# Patient Record
Sex: FEMALE | Race: OTHER | HISPANIC OR LATINO | Employment: FULL TIME | URBAN - METROPOLITAN AREA
[De-identification: names, ages, dates, MRNs, and addresses within clinical notes are randomized per-mention and may not be internally consistent; named-entity substitution may affect disease eponyms.]

---

## 2018-04-24 ENCOUNTER — APPOINTMENT (OUTPATIENT)
Dept: RADIOLOGY | Age: 23
End: 2018-04-24
Attending: FAMILY MEDICINE
Payer: COMMERCIAL

## 2018-04-24 ENCOUNTER — OFFICE VISIT (OUTPATIENT)
Dept: URGENT CARE | Age: 23
End: 2018-04-24
Payer: COMMERCIAL

## 2018-04-24 VITALS
HEIGHT: 61 IN | BODY MASS INDEX: 43.43 KG/M2 | DIASTOLIC BLOOD PRESSURE: 60 MMHG | TEMPERATURE: 98.4 F | SYSTOLIC BLOOD PRESSURE: 110 MMHG | WEIGHT: 230 LBS | HEART RATE: 97 BPM | RESPIRATION RATE: 18 BRPM | OXYGEN SATURATION: 97 %

## 2018-04-24 DIAGNOSIS — S99.911A ANKLE INJURY, RIGHT, INITIAL ENCOUNTER: Primary | ICD-10-CM

## 2018-04-24 DIAGNOSIS — S99.911A ANKLE INJURY, RIGHT, INITIAL ENCOUNTER: ICD-10-CM

## 2018-04-24 DIAGNOSIS — S80.212A ABRASION, LEFT KNEE, INITIAL ENCOUNTER: ICD-10-CM

## 2018-04-24 PROCEDURE — 73630 X-RAY EXAM OF FOOT: CPT

## 2018-04-24 PROCEDURE — 90715 TDAP VACCINE 7 YRS/> IM: CPT

## 2018-04-24 PROCEDURE — 73610 X-RAY EXAM OF ANKLE: CPT

## 2018-04-24 PROCEDURE — G0382 LEV 3 HOSP TYPE B ED VISIT: HCPCS | Performed by: FAMILY MEDICINE

## 2018-04-24 PROCEDURE — 90471 IMMUNIZATION ADMIN: CPT | Performed by: FAMILY MEDICINE

## 2018-04-24 PROCEDURE — 99283 EMERGENCY DEPT VISIT LOW MDM: CPT | Performed by: FAMILY MEDICINE

## 2018-04-24 RX ORDER — ALBUTEROL SULFATE 90 UG/1
2 AEROSOL, METERED RESPIRATORY (INHALATION) EVERY 4 HOURS PRN
COMMUNITY
Start: 2011-09-15

## 2018-04-24 NOTE — PATIENT INSTRUCTIONS
Rest, elevate, limit activity through next week  Ice to area 2 to 3 times a day for 15-20 minutes  Use Ace wrap while awake  Crutches  Tylenol, or ibuprofen (Advil/Motrin) or try Aleve (please take with food as needed for pain  Use antibiotic ointment on abrasion twice a day with clean dressing  Recheck/follow-up with family physician or orthopedics as needed  Lula Roth   [phone]    Please go to the hospital emergency department if worse

## 2018-04-24 NOTE — PROGRESS NOTES
[facility]        NAME: Tracey Ivey is a 23 y o  female  : 1995    MRN: [id number]  DATE: 2018  TIME: 8:24 PM    Assessment and Plan   Ankle injury, right, initial encounter [S99 911A]  1  Ankle injury, right, initial encounter  XR ankle 3+ vw right    XR foot 3+ vw right    Compression bandages    Crutches   2  Abrasion, left knee, initial encounter  TDAP Vaccine greater than or equal to 6yo         Patient Instructions     Patient Instructions   Rest, elevate, limit activity through next week  Ice to area 2 to 3 times a day for 15-20 minutes  Use Ace wrap while awake  Crutches  Tylenol, or ibuprofen (Advil/Motrin) or try Aleve (please take with food as needed for pain  Use antibiotic ointment on abrasion twice a day with clean dressing  Recheck/follow-up with family physician or orthopedics as needed  Gilberto Perry   [phone]    Please go to the hospital emergency department if worse  Chief Complaint     Chief Complaint   Patient presents with    Ankle Injury     Twisted R ankle today on uneven pavement and fell  Pain and swelling outer ankle and above inner ankle and into foot  Used ice and elevation  Also fell onto both knees - pain L knee with abrasions - no swelling       Knee Pain         History of Present Illness       Patient states she tripped this morning injuring her right ankle/right foot area landing on both knees; an abrasion is present over the anterior aspect of the left knee/left patella area; patient needs updating of her tetanus immunization status        Review of Systems   Review of Systems   Musculoskeletal:        Pain and swelling medial and lateral aspects of the right ankle and right foot areas   Skin:        Abrasion present over the anterior aspect of the left knee/left patella area         Current Medications       Current Outpatient Prescriptions:     albuterol (VENTOLIN HFA) 90 mcg/act inhaler, Inhale 2 puffs every 4 (four) hours as needed, Disp: , Rfl:     Current Allergies     Allergies as of 04/24/2018    (No Known Allergies)            The following portions of the patient's history were reviewed and updated as appropriate: allergies, current medications, past family history, past medical history, past social history, past surgical history and problem list      Past Medical History:   Diagnosis Date    Allergic rhinitis     Anxiety     Asthma     Depression        Past Surgical History:   Procedure Laterality Date    ADENOIDECTOMY      TONSILLECTOMY         No family history on file  Medications have been verified  Objective   /60 (BP Location: Left arm, Patient Position: Sitting, Cuff Size: Large)   Pulse 97   Temp 98 4 °F (36 9 °C) (Oral)   Resp 18   Ht 5' 1" (1 549 m)   Wt 104 kg (230 lb)   LMP 04/03/2018 (Exact Date)   SpO2 97%   BMI 43 46 kg/m²        Physical Exam     Physical Exam   Musculoskeletal:   Tenderness and swelling over the lateral and medial aspects of the right ankle and right foot areas; intact pulses, capillary refill, and sensation right lower extremity; patient able to for dorsi flex and plantar flex her right foot   Skin:   Abrasion present over the anterior aspect of the left knee/left patella area; area cleaned, antibiotic ointment and dressing applied   Nursing note and vitals reviewed        Right ankle x-ray - no fracture noted per radiologist (patient given copy of right ankle x-ray report)    Right foot x-ray - no fracture noted per radiologist (patient given copy of right foot x-ray report)      Ace wrap applied to right ankle by nurse    Crutches and crutch training given by nurse

## 2018-04-24 NOTE — LETTER
April 24, 2018     Patient: Nghia Salazar   YOB: 1995   Date of Visit: 4/24/2018       To Whom It May Concern: It is my medical opinion that Nghia Salazar may return to work on 04/30/2018  If you have any questions or concerns, please don't hesitate to call           Sincerely,        Jennifer Rodriguez DO    CC: No Recipients